# Patient Record
Sex: FEMALE | Race: WHITE | NOT HISPANIC OR LATINO | RURAL
[De-identification: names, ages, dates, MRNs, and addresses within clinical notes are randomized per-mention and may not be internally consistent; named-entity substitution may affect disease eponyms.]

---

## 2024-02-07 ENCOUNTER — LAB (OUTPATIENT)
Dept: RURAL CLINIC 8 | Facility: CLINIC | Age: 79
End: 2024-02-07

## 2024-02-07 PROBLEM — 235595009: Status: ACTIVE | Noted: 2024-02-07

## 2024-02-09 ENCOUNTER — LAB (OUTPATIENT)
Dept: RURAL CLINIC 8 | Facility: CLINIC | Age: 79
End: 2024-02-09

## 2024-02-09 ENCOUNTER — OV NP (OUTPATIENT)
Dept: RURAL CLINIC 8 | Facility: CLINIC | Age: 79
End: 2024-02-09
Payer: COMMERCIAL

## 2024-02-09 VITALS
TEMPERATURE: 97.9 F | BODY MASS INDEX: 26.06 KG/M2 | HEART RATE: 71 BPM | HEIGHT: 61 IN | WEIGHT: 138 LBS | SYSTOLIC BLOOD PRESSURE: 121 MMHG | DIASTOLIC BLOOD PRESSURE: 82 MMHG

## 2024-02-09 DIAGNOSIS — R49.0 HOARSENESS OF VOICE: ICD-10-CM

## 2024-02-09 DIAGNOSIS — K21.9 CHRONIC GERD: ICD-10-CM

## 2024-02-09 DIAGNOSIS — Z12.11 SCREENING FOR COLON CANCER: ICD-10-CM

## 2024-02-09 DIAGNOSIS — R12 HEARTBURN: ICD-10-CM

## 2024-02-09 DIAGNOSIS — I10 HTN (HYPERTENSION), BENIGN: ICD-10-CM

## 2024-02-09 PROBLEM — 10725009: Status: ACTIVE | Noted: 2024-02-09

## 2024-02-09 PROCEDURE — 99203 OFFICE O/P NEW LOW 30 MIN: CPT | Performed by: INTERNAL MEDICINE

## 2024-02-09 RX ORDER — LOSARTAN POTASSIUM 50 MG/1
1 TABLET TABLET, FILM COATED ORAL ONCE A DAY
Status: ACTIVE | COMMUNITY

## 2024-02-09 RX ORDER — MIRABEGRON 25 MG/1
1 TABLET TABLET, FILM COATED, EXTENDED RELEASE ORAL ONCE A DAY
Status: ACTIVE | COMMUNITY

## 2024-02-09 RX ORDER — POLYVINYL ALCOHOL, POVIDONE 14; 6 MG/ML; MG/ML
AS DIRECTED SOLUTION/ DROPS OPHTHALMIC
Status: ACTIVE | COMMUNITY

## 2024-02-09 RX ORDER — ASPIRIN 81 MG/1
1 TABLET TABLET, CHEWABLE ORAL ONCE A DAY
Status: ACTIVE | COMMUNITY

## 2024-02-09 RX ORDER — LORATADINE 10 MG/1
1 TABLET TABLET ORAL ONCE A DAY
Status: ACTIVE | COMMUNITY

## 2024-02-09 RX ORDER — CHLORTHALIDONE 25 MG/1
1 TABLET IN THE MORNING WITH FOOD TABLET ORAL
Status: ACTIVE | COMMUNITY

## 2024-02-09 RX ORDER — FLUTICASONE FUROATE, UMECLIDINIUM BROMIDE AND VILANTEROL TRIFENATATE 200; 62.5; 25 UG/1; UG/1; UG/1
1 PUFF POWDER RESPIRATORY (INHALATION) ONCE A DAY
Status: ACTIVE | COMMUNITY

## 2024-02-09 RX ORDER — POTASSIUM CHLORIDE 10 MEQ/1
1 TABLET WITH FOOD TABLET, FILM COATED, EXTENDED RELEASE ORAL TWICE A DAY
Status: ACTIVE | COMMUNITY

## 2024-02-09 RX ORDER — BISACODYL 5 MG
TAKE 4 TABLET, DELAYED RELEASE (ENTERIC COATED) ORAL
Qty: 4 | OUTPATIENT
Start: 2024-02-07 | End: 2024-02-08

## 2024-02-09 RX ORDER — LEVOTHYROXINE SODIUM 25 UG/1
1 TABLET IN THE MORNING ON AN EMPTY STOMACH TABLET ORAL ONCE A DAY
Status: ACTIVE | COMMUNITY

## 2024-02-09 RX ORDER — FAMOTIDINE 40 MG/1
1 TABLET AT BEDTIME TABLET, FILM COATED ORAL ONCE A DAY
Status: ACTIVE | COMMUNITY

## 2024-02-09 RX ORDER — ATORVASTATIN CALCIUM 10 MG/1
1 TABLET TABLET, FILM COATED ORAL ONCE A DAY
Status: ACTIVE | COMMUNITY

## 2024-02-09 RX ORDER — MONTELUKAST 10 MG/1
AS DIRECTED TABLET, FILM COATED ORAL
Status: ACTIVE | COMMUNITY

## 2024-02-09 RX ORDER — SODIUM, POTASSIUM,MAG SULFATES 17.5-3.13G
177 ML SOLUTION, RECONSTITUTED, ORAL ORAL
Qty: 1 KIT | Refills: 0 | OUTPATIENT
Start: 2024-02-07 | End: 2024-02-08

## 2024-02-09 NOTE — HPI-TODAY'S VISIT:
Patient comes in referral from Dr. Álvarez for screening colonoscopy.  The patient had a colonoscopy in 2014-10 years ago.  The report is not available and findings are unknown.  The patient also says that she has got problems with heartburn acid reflux and hoarseness of voice.  She tells me that her bowel movements are relatively normal.  She denies rectal bleeding abdominal pain or diarrhea.  She denies esophageal dysphagia to liquids or solids.

## 2024-05-09 ENCOUNTER — TELEPHONE ENCOUNTER (OUTPATIENT)
Dept: RURAL CLINIC 8 | Facility: CLINIC | Age: 79
End: 2024-05-09

## 2024-05-23 ENCOUNTER — CLAIMS CREATED FROM THE CLAIM WINDOW (OUTPATIENT)
Dept: RURAL MEDICAL CENTER 4 | Facility: MEDICAL CENTER | Age: 79
End: 2024-05-23

## 2024-05-23 ENCOUNTER — OFFICE VISIT (OUTPATIENT)
Dept: RURAL MEDICAL CENTER 4 | Facility: MEDICAL CENTER | Age: 79
End: 2024-05-23

## 2024-05-23 RX ORDER — FAMOTIDINE 40 MG/1
1 TABLET AT BEDTIME TABLET, FILM COATED ORAL ONCE A DAY
Status: ACTIVE | COMMUNITY

## 2024-05-23 RX ORDER — POTASSIUM CHLORIDE 10 MEQ/1
1 TABLET WITH FOOD TABLET, FILM COATED, EXTENDED RELEASE ORAL TWICE A DAY
Status: ACTIVE | COMMUNITY

## 2024-05-23 RX ORDER — MONTELUKAST 10 MG/1
AS DIRECTED TABLET, FILM COATED ORAL
Status: ACTIVE | COMMUNITY

## 2024-05-23 RX ORDER — POLYVINYL ALCOHOL, POVIDONE 14; 6 MG/ML; MG/ML
AS DIRECTED SOLUTION/ DROPS OPHTHALMIC
Status: ACTIVE | COMMUNITY

## 2024-05-23 RX ORDER — ATORVASTATIN CALCIUM 10 MG/1
1 TABLET TABLET, FILM COATED ORAL ONCE A DAY
Status: ACTIVE | COMMUNITY

## 2024-05-23 RX ORDER — FLUTICASONE FUROATE, UMECLIDINIUM BROMIDE AND VILANTEROL TRIFENATATE 200; 62.5; 25 UG/1; UG/1; UG/1
1 PUFF POWDER RESPIRATORY (INHALATION) ONCE A DAY
Status: ACTIVE | COMMUNITY

## 2024-05-23 RX ORDER — CLOTRIMAZOLE 10 MG/1
1 TROCHE LOZENGE ORAL; TOPICAL THREE TIMES A DAY
Qty: 30 | Refills: 0 | OUTPATIENT
Start: 2024-05-23 | End: 2024-06-02

## 2024-05-23 RX ORDER — ASPIRIN 81 MG/1
1 TABLET TABLET, CHEWABLE ORAL ONCE A DAY
Status: ACTIVE | COMMUNITY

## 2024-05-23 RX ORDER — LOSARTAN POTASSIUM 50 MG/1
1 TABLET TABLET, FILM COATED ORAL ONCE A DAY
Status: ACTIVE | COMMUNITY

## 2024-05-23 RX ORDER — CHLORTHALIDONE 25 MG/1
1 TABLET IN THE MORNING WITH FOOD TABLET ORAL
Status: ACTIVE | COMMUNITY

## 2024-05-23 RX ORDER — LORATADINE 10 MG/1
1 TABLET TABLET ORAL ONCE A DAY
Status: ACTIVE | COMMUNITY

## 2024-05-23 RX ORDER — MIRABEGRON 25 MG/1
1 TABLET TABLET, FILM COATED, EXTENDED RELEASE ORAL ONCE A DAY
Status: ACTIVE | COMMUNITY

## 2024-05-23 RX ORDER — LEVOTHYROXINE SODIUM 25 UG/1
1 TABLET IN THE MORNING ON AN EMPTY STOMACH TABLET ORAL ONCE A DAY
Status: ACTIVE | COMMUNITY